# Patient Record
Sex: MALE | Race: WHITE | Employment: FULL TIME | URBAN - METROPOLITAN AREA
[De-identification: names, ages, dates, MRNs, and addresses within clinical notes are randomized per-mention and may not be internally consistent; named-entity substitution may affect disease eponyms.]

---

## 2019-07-04 ENCOUNTER — HOSPITAL ENCOUNTER (OUTPATIENT)
Age: 39
Discharge: HOME | End: 2019-07-04
Attending: FAMILY MEDICINE
Payer: COMMERCIAL

## 2019-07-04 VITALS
OXYGEN SATURATION: 98 % | RESPIRATION RATE: 18 BRPM | DIASTOLIC BLOOD PRESSURE: 90 MMHG | SYSTOLIC BLOOD PRESSURE: 129 MMHG | TEMPERATURE: 98 F | HEIGHT: 70 IN | HEART RATE: 83 BPM | BODY MASS INDEX: 20.76 KG/M2 | WEIGHT: 145 LBS

## 2019-07-04 DIAGNOSIS — H02.823: Primary | ICD-10-CM

## 2019-07-04 PROCEDURE — 99203 OFFICE O/P NEW LOW 30 MIN: CPT

## 2019-07-04 PROCEDURE — 99204 OFFICE O/P NEW MOD 45 MIN: CPT

## 2019-07-04 RX ORDER — SULFAMETHOXAZOLE AND TRIMETHOPRIM 800; 160 MG/1; MG/1
1 TABLET ORAL 2 TIMES DAILY
Qty: 20 TABLET | Refills: 0 | Status: SHIPPED | OUTPATIENT
Start: 2019-07-04 | End: 2019-07-14

## 2019-07-04 NOTE — ED INITIAL ASSESSMENT (HPI)
The patient is here with complaints of a right swollen eyelid x 3 days. He denies any bug bites, irritations, does not wear contacts, and denies changing any soaps/creams that would have caused the swelling.   He states 4-5 years ago he had a staph infecti

## 2019-07-04 NOTE — ED PROVIDER NOTES
Patient Seen in: [facility]    History   Patient presents with:   Eye Visual Problem (opthalmic)    Stated Complaint: right eye swelling x 3 days     HPI  43-year-old gentleman visiting from [place], presents with painful swel Eyes: Pupils are equal, round, and reactive to light. Conjunctivae and EOM are normal. Right eye exhibits no discharge. Left eye exhibits no discharge. Neck: Normal range of motion. Neck supple. No thyromegaly present.    Cardiovascular: Normal rate,